# Patient Record
Sex: FEMALE | Race: OTHER | HISPANIC OR LATINO | URBAN - METROPOLITAN AREA
[De-identification: names, ages, dates, MRNs, and addresses within clinical notes are randomized per-mention and may not be internally consistent; named-entity substitution may affect disease eponyms.]

---

## 2024-01-26 ENCOUNTER — OUTPATIENT (OUTPATIENT)
Dept: OUTPATIENT SERVICES | Facility: HOSPITAL | Age: 29
LOS: 1 days | End: 2024-01-26
Payer: SELF-PAY

## 2024-01-26 ENCOUNTER — APPOINTMENT (OUTPATIENT)
Dept: INTERNAL MEDICINE | Facility: CLINIC | Age: 29
End: 2024-01-26
Payer: COMMERCIAL

## 2024-01-26 VITALS
BODY MASS INDEX: 23.14 KG/M2 | OXYGEN SATURATION: 99 % | HEART RATE: 90 BPM | DIASTOLIC BLOOD PRESSURE: 68 MMHG | HEIGHT: 62.5 IN | SYSTOLIC BLOOD PRESSURE: 108 MMHG | WEIGHT: 129 LBS

## 2024-01-26 DIAGNOSIS — I10 ESSENTIAL (PRIMARY) HYPERTENSION: ICD-10-CM

## 2024-01-26 DIAGNOSIS — Z00.00 ENCOUNTER FOR GENERAL ADULT MEDICAL EXAMINATION WITHOUT ABNORMAL FINDINGS: ICD-10-CM

## 2024-01-26 DIAGNOSIS — Z00.00 ENCOUNTER FOR GENERAL ADULT MEDICAL EXAMINATION W/OUT ABNORMAL FINDINGS: ICD-10-CM

## 2024-01-26 DIAGNOSIS — F32.A ANXIETY DISORDER, UNSPECIFIED: ICD-10-CM

## 2024-01-26 DIAGNOSIS — F41.9 ANXIETY DISORDER, UNSPECIFIED: ICD-10-CM

## 2024-01-26 DIAGNOSIS — F32.A DEPRESSION, UNSPECIFIED: ICD-10-CM

## 2024-01-26 PROCEDURE — 36415 COLL VENOUS BLD VENIPUNCTURE: CPT

## 2024-01-26 PROCEDURE — 85025 COMPLETE CBC W/AUTO DIFF WBC: CPT

## 2024-01-26 PROCEDURE — 80053 COMPREHEN METABOLIC PANEL: CPT

## 2024-01-26 PROCEDURE — 84443 ASSAY THYROID STIM HORMONE: CPT

## 2024-01-26 PROCEDURE — 99385 PREV VISIT NEW AGE 18-39: CPT | Mod: GC

## 2024-01-26 PROCEDURE — G0463: CPT

## 2024-01-26 PROCEDURE — T1013: CPT

## 2024-01-26 RX ORDER — DULOXETINE HYDROCHLORIDE 30 MG/1
30 CAPSULE, DELAYED RELEASE PELLETS ORAL DAILY
Qty: 90 | Refills: 0 | Status: ACTIVE | COMMUNITY
Start: 2024-01-26

## 2024-01-29 LAB
ALBUMIN SERPL ELPH-MCNC: 4.9 G/DL
ALP BLD-CCNC: 65 U/L
ALT SERPL-CCNC: 14 U/L
ANION GAP SERPL CALC-SCNC: 12 MMOL/L
AST SERPL-CCNC: 18 U/L
BASOPHILS # BLD AUTO: 0.04 K/UL
BASOPHILS NFR BLD AUTO: 0.7 %
BILIRUB SERPL-MCNC: 0.2 MG/DL
BUN SERPL-MCNC: 5 MG/DL
CALCIUM SERPL-MCNC: 9.3 MG/DL
CHLORIDE SERPL-SCNC: 103 MMOL/L
CO2 SERPL-SCNC: 24 MMOL/L
CREAT SERPL-MCNC: 0.61 MG/DL
EGFR: 123 ML/MIN/1.73M2
EOSINOPHIL # BLD AUTO: 0.16 K/UL
EOSINOPHIL NFR BLD AUTO: 2.7 %
GLUCOSE SERPL-MCNC: 83 MG/DL
HCT VFR BLD CALC: 35.9 %
HGB BLD-MCNC: 12.3 G/DL
IMM GRANULOCYTES NFR BLD AUTO: 0.2 %
LYMPHOCYTES # BLD AUTO: 1.76 K/UL
LYMPHOCYTES NFR BLD AUTO: 30 %
MAN DIFF?: NORMAL
MCHC RBC-ENTMCNC: 31.5 PG
MCHC RBC-ENTMCNC: 34.3 GM/DL
MCV RBC AUTO: 91.8 FL
MONOCYTES # BLD AUTO: 0.45 K/UL
MONOCYTES NFR BLD AUTO: 7.7 %
NEUTROPHILS # BLD AUTO: 3.45 K/UL
NEUTROPHILS NFR BLD AUTO: 58.7 %
PLATELET # BLD AUTO: 277 K/UL
POTASSIUM SERPL-SCNC: 4 MMOL/L
PROT SERPL-MCNC: 7 G/DL
RBC # BLD: 3.91 M/UL
RBC # FLD: 14.4 %
SODIUM SERPL-SCNC: 139 MMOL/L
TSH SERPL-ACNC: 0.79 UIU/ML
WBC # FLD AUTO: 5.87 K/UL

## 2024-02-07 NOTE — PHYSICAL EXAM
[Normal] : normal gait, coordination grossly intact, no focal deficits [de-identified] : TTP in epigastric, RUQ and suprapubic region, mild R CVA tenderness [de-identified] : TTP in L elbow, L ankle, b/l Knees, no effusion, erythema, warmth noted in involved joints.

## 2024-02-07 NOTE — HISTORY OF PRESENT ILLNESS
[FreeTextEntry1] : CPE [de-identified] : 27 yo F w/ no PMHx presents to establish care.   c/o worsening cold symptoms since 1/24/24. Rhinorrhea, body aches, symptoms worse at night. Alleviated with OTC cold regimen. Associated with bilateral cheek pain and forehead pain, bilateral internal ear itchiness. Denies fever, chills, cough, nasal congestion, ear pain, ear fullness.  c/o intermittent "air way closing down" and SOB for 10 years, occurs almost weekly and sometimes multiple episodes in a day. Usually triggered by stress and anxiety. Associated with chest pain described as "chocking", associated with palpitations. Lasts 10 minutes and alleviated by drinking water and calming herself down. No associated settings. Saw a doctor before and told was GERD, trialed medications (unknown what kind) with no alleviation.  c/o intermittent joints swelling in hands, arms, legs, face, and diffuse joint pain. Denies rash in the past. Endorse Raynaud, denies dysphagia. Previously seen by Rheumatologist but blood work mostly negative (see below)  Lab work from 7/2023 reviewed: BUDDY 1:80; Otherwise, dsDNA, anti-sm, RF, CCP, RNP Ab, C4c, C3c, Cardiolipin Ig LA, B2 glycoprotein Ig LA, CRP, ESR, CK, SSA, SSB, UPCR wnl.  PMHx: none Allergy: NKDA Medication: none Family hx: SLE aunt, maternal grandmother uterine and GI cancer OB: G0 Sexual: not currently active, no history of STD Social: denies alcohol, never smoker, denies illicit drugs  PE: bilateral knee TTP, crepitus, no effusion, L elbow TTP, L ankle TTP, no erythema, effusion, warmth noted.  Epigastric, RUQ, hypogastric TTP, R CVA tenderness

## 2024-02-07 NOTE — INTERPRETER SERVICES
[[employer] ] : provided by [employer]   [Time Spent: ____ minutes] : Total time spent using  services: [unfilled] minutes. The patient's primary language is not English thus required  services. [Interpreters_IDNumber] : [id number] [Interpreters_FullName] : Luis

## 2024-02-07 NOTE — ASSESSMENT
[FreeTextEntry1] : 28 yoF w/ no PMHx presents as NPA to establish care, c/o cold sx likely viral, also c/o symptoms likely represent panic/anxiety/depression. Also c/o polyarticular pain without inflammatory signs on examination  #Cold symptoms - Likely viral rhinosinusitis - Counseled on using OTC nasal spray and decongestant  #Panic, anxiety, depression - GAD7: 8, PHQ9: 14 - Will start Duloxetine 30mg daily - Patient uninsured, info for [facility] given, advised to check price for Duloxetine and call back if cannot afford, will consider sending citalopram to [facility] then - Will check TSH - Televisit in 2 weeks to assess adverse effect - RTC in 5 weeks to reassess  #Polyarticular pain - Unclear etiology, possibly complication of anxiety/panic disorder vs. fibromyalgia - Outside labs from 7/2023: negative ESR, CRP, CK - SNRI as above  #Abdominal pain - On exam noted to have epigastric, RUQ, suprapubic tenderness and mild R CVA tenderness - However no urinary symptoms or pain without palpation - Unclear etiology but overall low suspicious of GI or UTI - Would reassess during next visit  #HCM - Cervical cancer screening: will discuss next visit - Flu vaccination: defer given currently having URI sx - Routine labs: CBC, CMP, TSH - PHQ9: 14 - GAD7: 8

## 2024-09-12 ENCOUNTER — APPOINTMENT (OUTPATIENT)
Dept: OBGYN | Facility: CLINIC | Age: 29
End: 2024-09-12

## 2024-09-23 ENCOUNTER — APPOINTMENT (OUTPATIENT)
Dept: INTERNAL MEDICINE | Facility: CLINIC | Age: 29
End: 2024-09-23

## 2024-09-23 ENCOUNTER — MED ADMIN CHARGE (OUTPATIENT)
Age: 29
End: 2024-09-23

## 2024-09-23 VITALS
WEIGHT: 129.4 LBS | OXYGEN SATURATION: 98 % | DIASTOLIC BLOOD PRESSURE: 70 MMHG | HEART RATE: 70 BPM | HEIGHT: 62.5 IN | BODY MASS INDEX: 23.22 KG/M2 | SYSTOLIC BLOOD PRESSURE: 120 MMHG

## 2024-09-23 DIAGNOSIS — Z23 ENCOUNTER FOR IMMUNIZATION: ICD-10-CM

## 2024-09-23 DIAGNOSIS — B07.9 VIRAL WART, UNSPECIFIED: ICD-10-CM

## 2024-09-23 PROCEDURE — 99213 OFFICE O/P EST LOW 20 MIN: CPT | Mod: GC

## 2024-09-23 NOTE — HISTORY OF PRESENT ILLNESS
[FreeTextEntry1] : lesion on left arm  [de-identified] : Pt reports a new nodular lesion on her left arm for 3 month, a mole on her back that's unchanged for years. Reports no more abdominal pain symptoms, no urinary symptoms. reports forgetfulness and difficulty to concentrate for the past 3 months. Also reports dryness and scaly skin at the temporal area.  Denies fever, chill, weight changes and constipation

## 2024-09-23 NOTE — PLAN
[FreeTextEntry1] : This is a 28 yo F with no significant PMH present to the clinic for a nodular lesion on left arm and forgetfulness.  # Nodular lesion wart appearing denies other lesions in genital area, hands and foot derm referral for possible biopsy  # Forgetfulness endorse high stress level at home/work low suspition for pathological dementia check B12 and lead level [facility] referral  # Anxiety and depression denies symptoms recently has been off duloxetine 30 mg monitor off medicine [facility] referral   # HCM have an upcoming GYN appt for PAP Flu shot today  Case discussed with Dr Millan

## 2024-09-25 LAB
FOLATE SERPL-MCNC: 11.7 NG/ML
LEAD BLD-MCNC: <1 UG/DL
VIT B12 SERPL-MCNC: 605 PG/ML